# Patient Record
Sex: MALE | Race: AMERICAN INDIAN OR ALASKA NATIVE
[De-identification: names, ages, dates, MRNs, and addresses within clinical notes are randomized per-mention and may not be internally consistent; named-entity substitution may affect disease eponyms.]

---

## 2018-03-06 ENCOUNTER — HOSPITAL ENCOUNTER (EMERGENCY)
Dept: HOSPITAL 14 - H.ER | Age: 28
Discharge: HOME | End: 2018-03-06
Payer: COMMERCIAL

## 2018-03-06 VITALS — OXYGEN SATURATION: 99 %

## 2018-03-06 VITALS
HEART RATE: 75 BPM | TEMPERATURE: 98 F | DIASTOLIC BLOOD PRESSURE: 75 MMHG | SYSTOLIC BLOOD PRESSURE: 126 MMHG | RESPIRATION RATE: 14 BRPM

## 2018-03-06 DIAGNOSIS — N20.1: Primary | ICD-10-CM

## 2018-03-06 DIAGNOSIS — K57.30: ICD-10-CM

## 2018-03-06 LAB
BUN SERPL-MCNC: 12 MG/DL (ref 9–20)
CALCIUM SERPL-MCNC: 9.7 MG/DL (ref 8.4–10.2)
ERYTHROCYTE [DISTWIDTH] IN BLOOD BY AUTOMATED COUNT: 12.2 % (ref 11.5–14.5)
GFR NON-AFRICAN AMERICAN: > 60
HGB BLD-MCNC: 14.1 G/DL (ref 12–18)
MCH RBC QN AUTO: 33.2 PG (ref 27–31)
MCHC RBC AUTO-ENTMCNC: 34.5 G/DL (ref 33–37)
MCV RBC AUTO: 96.1 FL (ref 80–94)
PLATELET # BLD: 332 K/UL (ref 130–400)
RBC # BLD AUTO: 4.26 MIL/UL (ref 4.4–5.9)
WBC # BLD AUTO: 18.1 K/UL (ref 4.8–10.8)

## 2018-03-06 PROCEDURE — 85027 COMPLETE CBC AUTOMATED: CPT

## 2018-03-06 PROCEDURE — 80048 BASIC METABOLIC PNL TOTAL CA: CPT

## 2018-03-06 PROCEDURE — 99284 EMERGENCY DEPT VISIT MOD MDM: CPT

## 2018-03-06 PROCEDURE — 74176 CT ABD & PELVIS W/O CONTRAST: CPT

## 2018-03-06 PROCEDURE — 96374 THER/PROPH/DIAG INJ IV PUSH: CPT

## 2018-03-06 NOTE — ED PDOC
HPI: Abdomen


Time Seen by Provider: 03/06/18 07:38


Chief Complaint (Nursing): Abdominal Pain


Chief Complaint (Provider): Flank Pain


History Per: Patient


History/Exam Limitations: no limitations


Onset/Duration Of Symptoms: Hrs


Current Symptoms Are (Timing): Still Present


Location Of Pain/Discomfort: Other (right flank)


Quality Of Discomfort: Sharp, Stabbing


Associated Symptoms: Chills, Nausea, Vomiting (x2 episodes), Back Pain (right 

flank).  denies: Fever, Diarrhea, Chest Pain, Urinary Symptoms


Additional Complaint(s): 


Ruiz Prater is a 28 year old male with no past medical history, who presents 

to the ED with complains of right sided flank pain and associated vomiting and 

chills, onset 5 am this morning. Patient describes the pain as a constant sharp 

and stabbing intolerable sensation. He states he had 2 episodes of vomiting, 

and the pain has fractionally lessened. Patient denies any fever, hematuria, 

diarrhea, shortness of breath, or chest pain. He offers no other medical 

complaints at this time.





PMD: Pantera Mirza








Past Medical History


Reviewed: Historical Data, Nursing Documentation, Vital Signs


Vital Signs: 


 Last Vital Signs











Temp  97 F L  03/06/18 07:40


 


Pulse  90   03/06/18 07:40


 


Resp  16   03/06/18 07:40


 


BP  122/84   03/06/18 07:40


 


Pulse Ox  99   03/06/18 10:37














- Medical History


PMH: No Chronic Diseases





- Surgical History


Surgical History: Tonsillectomy





- Family History


Family History: States: Unknown Family Hx





- Social History


Alcohol: None


Drugs: Denies





- Home Medications


Home Medications: 


 Ambulatory Orders











 Medication  Instructions  Recorded


 


Ciprofloxacin HCl [Cipro] 500 mg PO BID #14 tablet 03/06/18


 


Tamsulosin [Flomax] 0.4 mg PO DAILY #7 cap 03/06/18


 


oxyCODONE/Acetaminophen [Percocet 1 ea PO Q6 PRN #15 tab 03/06/18





5/325 mg Tab]  














- Allergies


Allergies/Adverse Reactions: 


 Allergies











Allergy/AdvReac Type Severity Reaction Status Date / Time


 


No Known Allergies Allergy   Verified 03/06/18 07:40














Review of Systems


ROS Statement: Except As Marked, All Systems Reviewed And Found Negative


Constitutional: Positive for: Chills.  Negative for: Fever


Cardiovascular: Negative for: Chest Pain


Respiratory: Negative for: Shortness of Breath


Gastrointestinal: Positive for: Nausea, Vomiting.  Negative for: Diarrhea


Genitourinary Male: Negative for: Hematuria


Musculoskeletal: Positive for: Back Pain (right flank)





Physical Exam





- Reviewed


Nursing Documentation Reviewed: Yes


Vital Signs Reviewed: Yes





- Physical Exam


Appears: Positive for: Non-toxic, No Acute Distress


Head Exam: Positive for: ATRAUMATIC, NORMAL INSPECTION, NORMOCEPHALIC


Skin: Positive for: Normal Color, Warm, Dry


Eye Exam: Positive for: EOMI, Normal appearance, PERRL


Neck: Positive for: Normal, Painless ROM


Cardiovascular/Chest: Positive for: Regular Rate, Rhythm.  Negative for: Murmur


Respiratory: Positive for: Normal Breath Sounds.  Negative for: Respiratory 

Distress


Gastrointestinal/Abdominal: Positive for: Normal Exam, Soft.  Negative for: 

Tenderness


Back: Positive for: Normal Inspection.  Negative for: L CVA Tenderness, R CVA 

Tenderness, Vertebral Tenderness


Extremity: Positive for: Normal ROM.  Negative for: Pedal Edema, Deformity, 

Swelling


Neurologic/Psych: Positive for: Alert, Oriented





- Laboratory Results


Result Diagrams: 


 03/06/18 09:14





 03/06/18 09:14





- ECG


O2 Sat by Pulse Oximetry: 99 (RA)


Pulse Ox Interpretation: Normal





- Progress


Re-evaluation Time: 10:55


Condition: Re-examined, Improved





Medical Decision Making


Medical Decision Making: 


Time: 8:40


Initial Impression: right flank pain


Differentials: Urinary Tract Infection, Nephrolithiasis 


Plan: 


--CT Abd/Pelvis


--BMP


--ED Urine Dipstick


--CBC


--Toradol 30 mg IVP





CT Abd/Pelvis:





FINDINGS:





LOWER THORAX:


Unremarkable. 





LIVER:


Unremarkable. No gross lesion or ductal dilatation.  





GALLBLADDER AND BILE DUCTS:


Unremarkable. 





PANCREAS:


Unremarkable. No gross lesion or ductal dilatation.





SPLEEN:


Unremarkable. 





ADRENALS:


Unremarkable. No mass. 





KIDNEYS AND URETERS:


A 4.5 mm linear calculus obstructs the distal right ureter proximal to the 

right uterus with junction with mild right hydroureter evident but no definite 

hydronephrosis at this time.  Trace periureteral reaction appreciated distally. 

No definite left sided obstructive uropathy. A 5 mm linear intrarenal calculus 

identified at the mid to lower pole right kidney with no radiodense calculus 

identified at the right. The urinary bladder is mildly distended but otherwise 

unremarkable appearing. 





VASCULATURE:


Unremarkable. No aortic aneurysm. 





BOWEL:


Evaluation is limited to lack of oral and intravenous contrast with limited 

sigmoid diverticulosis is evident without acute changes.  Collapse of the 

rectosigmoid limits evaluation of the wall with thickening likely a function of 

collapse though other etiologies are difficult to completely exclude but not 

are not favored. 





APPENDIX:


Unremarkable. Normal appendix. 





PERITONEUM:


Unremarkable. No free fluid. No free air. 





LYMPH NODES:


Unremarkable. No enlarged lymph nodes. 





BLADDER:


As above in renal section. 





REPRODUCTIVE:


Unremarkable. 





BONES:


No acute fracture. 





OTHER FINDINGS:


None.





IMPRESSION:


A 4.5 mm linear calculus obstructing distal right ureter proximal the right 

ureterovesical junction with only mild right hydroureter identified including 

local periureteral reaction. No hydronephrosis bilaterally. A similar calculus 

is slightly larger at the mid to lower pole right kidney within a calyx. No left

-sided radiodense urolithiasis. 





Limited sigmoid diverticulosis without diverticulitis.











--------------------------------------------------------------------------------

-----------------


Scribe Attestation:


Documented by Ira Srinivasan, acting as a scribe for Lissa Stephen MD





Provider Scribe Attestation:


All medical record entries made by the Scribe were at my direction and 

personally dictated by me. I have reviewed the chart and agree that the record 

accurately reflects my personal performance of the history, physical exam, 

medical decision making, and the department course for this patient. I have 

also personally directed, reviewed, and agree with the discharge instructions 

and disposition.





Disposition





- Clinical Impression


Clinical Impression: 


 Right ureteral stone, Renal colic on right side








- Patient ED Disposition


Is Patient to be Admitted: No


Doctor Will See Patient In The: Office


Counseled Patient/Family Regarding: Studies Performed, Need For Followup





- Disposition


Referrals: 


Korina Randall MD [Medical Doctor] - 


Disposition: Routine/Home


Disposition Time: 10:56


Condition: GOOD


Additional Instructions: 


Take your medications as instructed. Follow up with your PCP in 2-3 days. 

Return for worsening. You will be called in 2 days. 


Prescriptions: 


Ciprofloxacin HCl [Cipro] 500 mg PO BID #14 tablet


oxyCODONE/Acetaminophen [Percocet 5/325 mg Tab] 1 ea PO Q6 PRN #15 tab


 PRN Reason: Pain, Severe (8-10)


Tamsulosin [Flomax] 0.4 mg PO DAILY #7 cap


Instructions:  Kidney Stones in Adults


Forms:  [employer] (English)